# Patient Record
Sex: MALE
[De-identification: names, ages, dates, MRNs, and addresses within clinical notes are randomized per-mention and may not be internally consistent; named-entity substitution may affect disease eponyms.]

---

## 2017-04-20 PROBLEM — Z00.00 ENCOUNTER FOR PREVENTIVE HEALTH EXAMINATION: Status: ACTIVE | Noted: 2017-04-20

## 2017-04-24 ENCOUNTER — APPOINTMENT (OUTPATIENT)
Dept: OTOLARYNGOLOGY | Facility: CLINIC | Age: 67
End: 2017-04-24

## 2017-04-24 VITALS
SYSTOLIC BLOOD PRESSURE: 131 MMHG | WEIGHT: 185 LBS | HEART RATE: 88 BPM | HEIGHT: 69 IN | DIASTOLIC BLOOD PRESSURE: 80 MMHG | BODY MASS INDEX: 27.4 KG/M2

## 2017-04-24 DIAGNOSIS — Z86.39 PERSONAL HISTORY OF OTHER ENDOCRINE, NUTRITIONAL AND METABOLIC DISEASE: ICD-10-CM

## 2017-04-24 DIAGNOSIS — E78.00 PURE HYPERCHOLESTEROLEMIA, UNSPECIFIED: ICD-10-CM

## 2017-04-24 DIAGNOSIS — Z78.9 OTHER SPECIFIED HEALTH STATUS: ICD-10-CM

## 2017-04-24 DIAGNOSIS — Z83.3 FAMILY HISTORY OF DIABETES MELLITUS: ICD-10-CM

## 2017-04-24 DIAGNOSIS — Z86.79 PERSONAL HISTORY OF OTHER DISEASES OF THE CIRCULATORY SYSTEM: ICD-10-CM

## 2017-04-24 RX ORDER — METOCLOPRAMIDE 10 MG/1
10 TABLET ORAL
Qty: 3 | Refills: 0 | Status: ACTIVE | COMMUNITY
Start: 2016-12-14

## 2017-04-24 RX ORDER — ATORVASTATIN CALCIUM 20 MG/1
20 TABLET, FILM COATED ORAL
Qty: 30 | Refills: 0 | Status: ACTIVE | COMMUNITY
Start: 2017-03-28

## 2017-04-24 RX ORDER — BISMUTH SUBSALICYLATE 262 MG
262 TABLET,CHEWABLE ORAL
Qty: 112 | Refills: 0 | Status: ACTIVE | COMMUNITY
Start: 2017-01-25

## 2017-04-24 RX ORDER — METFORMIN HYDROCHLORIDE 850 MG/1
850 TABLET, COATED ORAL
Qty: 60 | Refills: 0 | Status: ACTIVE | COMMUNITY
Start: 2017-03-28

## 2017-04-24 RX ORDER — OMEPRAZOLE 20 MG/1
20 CAPSULE, DELAYED RELEASE ORAL
Qty: 28 | Refills: 0 | Status: ACTIVE | COMMUNITY
Start: 2017-01-25

## 2017-04-24 RX ORDER — BLOOD SUGAR DIAGNOSTIC
STRIP MISCELLANEOUS
Qty: 50 | Refills: 0 | Status: ACTIVE | COMMUNITY
Start: 2017-03-01

## 2017-04-24 RX ORDER — SODIUM SULFATE, POTASSIUM SULFATE, MAGNESIUM SULFATE 17.5; 3.13; 1.6 G/ML; G/ML; G/ML
17.5-3.13-1.6 SOLUTION, CONCENTRATE ORAL
Qty: 354 | Refills: 0 | Status: ACTIVE | COMMUNITY
Start: 2016-12-14

## 2017-04-24 RX ORDER — DOXYCYCLINE 100 MG/1
100 CAPSULE ORAL
Qty: 28 | Refills: 0 | Status: ACTIVE | COMMUNITY
Start: 2017-01-25

## 2017-04-24 RX ORDER — INSULIN HUMAN 100 [IU]/ML
100 INJECTION, SUSPENSION SUBCUTANEOUS
Qty: 20 | Refills: 0 | Status: ACTIVE | COMMUNITY
Start: 2017-03-28

## 2017-04-24 RX ORDER — EMPAGLIFLOZIN 25 MG/1
25 TABLET, FILM COATED ORAL
Qty: 30 | Refills: 0 | Status: ACTIVE | COMMUNITY
Start: 2017-03-28

## 2017-04-24 RX ORDER — SIMETHICONE 125 MG/1
125 TABLET, CHEWABLE ORAL
Qty: 2 | Refills: 0 | Status: ACTIVE | COMMUNITY
Start: 2016-12-14

## 2017-04-24 RX ORDER — BACITRACIN 500 [USP'U]/G
500 OINTMENT OPHTHALMIC
Qty: 4 | Refills: 0 | Status: ACTIVE | COMMUNITY
Start: 2016-12-27

## 2017-04-24 RX ORDER — AMLODIPINE BESYLATE 10 MG/1
10 TABLET ORAL
Qty: 30 | Refills: 0 | Status: ACTIVE | COMMUNITY
Start: 2017-04-06

## 2017-04-24 RX ORDER — BLOOD-GLUCOSE METER
W/DEVICE KIT MISCELLANEOUS
Qty: 1 | Refills: 0 | Status: ACTIVE | COMMUNITY
Start: 2016-12-07

## 2017-04-24 RX ORDER — LANCETS 30 GAUGE
EACH MISCELLANEOUS
Qty: 100 | Refills: 0 | Status: ACTIVE | COMMUNITY
Start: 2017-03-01

## 2017-04-24 RX ORDER — ENALAPRIL MALEATE 20 MG/1
20 TABLET ORAL
Qty: 30 | Refills: 0 | Status: ACTIVE | COMMUNITY
Start: 2016-12-06

## 2017-04-24 RX ORDER — NAPROXEN 500 MG/1
500 TABLET ORAL
Qty: 60 | Refills: 0 | Status: ACTIVE | COMMUNITY
Start: 2017-02-24

## 2017-04-24 RX ORDER — SYRING-NEEDL,DISP,INSUL,0.3 ML 31 GX5/16"
31G X 5/16" SYRINGE, EMPTY DISPOSABLE MISCELLANEOUS
Qty: 100 | Refills: 0 | Status: ACTIVE | COMMUNITY
Start: 2017-03-01

## 2017-04-24 RX ORDER — ALCOHOL ANTISEPTIC PADS
PADS, MEDICATED (EA) TOPICAL
Qty: 100 | Refills: 0 | Status: ACTIVE | COMMUNITY
Start: 2017-03-01

## 2017-04-24 RX ORDER — METRONIDAZOLE 250 MG/1
250 TABLET ORAL
Qty: 56 | Refills: 0 | Status: ACTIVE | COMMUNITY
Start: 2017-01-25

## 2017-04-24 RX ORDER — LOSARTAN POTASSIUM AND HYDROCHLOROTHIAZIDE 12.5; 5 MG/1; MG/1
50-12.5 TABLET ORAL
Qty: 30 | Refills: 0 | Status: ACTIVE | COMMUNITY
Start: 2017-03-01

## 2017-04-24 RX ORDER — ASPIRIN 81 MG/1
81 TABLET ORAL
Qty: 30 | Refills: 0 | Status: ACTIVE | COMMUNITY
Start: 2017-03-01

## 2017-04-24 RX ORDER — LOSARTAN POTASSIUM AND HYDROCHLOROTHIAZIDE 12.5; 1 MG/1; MG/1
100-12.5 TABLET ORAL
Qty: 30 | Refills: 0 | Status: ACTIVE | COMMUNITY
Start: 2017-04-03

## 2017-05-09 ENCOUNTER — APPOINTMENT (OUTPATIENT)
Dept: OTOLARYNGOLOGY | Facility: CLINIC | Age: 67
End: 2017-05-09

## 2017-05-09 VITALS
DIASTOLIC BLOOD PRESSURE: 67 MMHG | WEIGHT: 185 LBS | SYSTOLIC BLOOD PRESSURE: 106 MMHG | HEART RATE: 100 BPM | BODY MASS INDEX: 27.4 KG/M2 | HEIGHT: 69 IN

## 2018-04-03 ENCOUNTER — APPOINTMENT (OUTPATIENT)
Dept: OTOLARYNGOLOGY | Facility: CLINIC | Age: 68
End: 2018-04-03
Payer: MEDICAID

## 2018-04-03 VITALS
HEART RATE: 74 BPM | SYSTOLIC BLOOD PRESSURE: 136 MMHG | DIASTOLIC BLOOD PRESSURE: 80 MMHG | HEIGHT: 69 IN | WEIGHT: 188 LBS | BODY MASS INDEX: 27.85 KG/M2

## 2018-04-03 DIAGNOSIS — R04.0 EPISTAXIS: ICD-10-CM

## 2018-04-03 PROCEDURE — 99213 OFFICE O/P EST LOW 20 MIN: CPT | Mod: 25

## 2018-04-03 PROCEDURE — 31238 NSL/SINS NDSC SRG NSL HEMRRG: CPT | Mod: RT

## 2018-10-01 ENCOUNTER — OUTPATIENT (OUTPATIENT)
Dept: OUTPATIENT SERVICES | Facility: HOSPITAL | Age: 68
LOS: 1 days | End: 2018-10-01
Payer: MEDICAID

## 2018-10-01 PROCEDURE — G9001: CPT

## 2018-10-22 ENCOUNTER — INPATIENT (INPATIENT)
Facility: HOSPITAL | Age: 68
LOS: 1 days | Discharge: HOME | DRG: 301 | End: 2018-10-24
Attending: STUDENT IN AN ORGANIZED HEALTH CARE EDUCATION/TRAINING PROGRAM | Admitting: STUDENT IN AN ORGANIZED HEALTH CARE EDUCATION/TRAINING PROGRAM
Payer: MEDICAID

## 2018-10-22 VITALS
RESPIRATION RATE: 18 BRPM | OXYGEN SATURATION: 96 % | SYSTOLIC BLOOD PRESSURE: 169 MMHG | TEMPERATURE: 99 F | HEART RATE: 76 BPM | DIASTOLIC BLOOD PRESSURE: 80 MMHG

## 2018-10-22 LAB
ALBUMIN SERPL ELPH-MCNC: 4.9 G/DL — SIGNIFICANT CHANGE UP (ref 3.3–5)
ALP SERPL-CCNC: 73 U/L — SIGNIFICANT CHANGE UP (ref 40–120)
ALT FLD-CCNC: 16 U/L — SIGNIFICANT CHANGE UP (ref 10–45)
ANION GAP SERPL CALC-SCNC: 13 MMOL/L — SIGNIFICANT CHANGE UP (ref 5–17)
APTT BLD: 32.7 SEC — SIGNIFICANT CHANGE UP (ref 27.5–37.4)
AST SERPL-CCNC: 16 U/L — SIGNIFICANT CHANGE UP (ref 10–40)
BASOPHILS # BLD AUTO: 0.1 K/UL — SIGNIFICANT CHANGE UP (ref 0–0.2)
BASOPHILS NFR BLD AUTO: 0.8 % — SIGNIFICANT CHANGE UP (ref 0–2)
BILIRUB SERPL-MCNC: 0.6 MG/DL — SIGNIFICANT CHANGE UP (ref 0.2–1.2)
BUN SERPL-MCNC: 18 MG/DL — SIGNIFICANT CHANGE UP (ref 7–23)
CALCIUM SERPL-MCNC: 9.8 MG/DL — SIGNIFICANT CHANGE UP (ref 8.4–10.5)
CHLORIDE SERPL-SCNC: 101 MMOL/L — SIGNIFICANT CHANGE UP (ref 96–108)
CO2 SERPL-SCNC: 25 MMOL/L — SIGNIFICANT CHANGE UP (ref 22–31)
CREAT SERPL-MCNC: 1.01 MG/DL — SIGNIFICANT CHANGE UP (ref 0.5–1.3)
EOSINOPHIL # BLD AUTO: 0.3 K/UL — SIGNIFICANT CHANGE UP (ref 0–0.5)
EOSINOPHIL NFR BLD AUTO: 3.2 % — SIGNIFICANT CHANGE UP (ref 0–6)
ERYTHROCYTE [SEDIMENTATION RATE] IN BLOOD: 11 MM/HR — SIGNIFICANT CHANGE UP (ref 0–20)
GLUCOSE SERPL-MCNC: 129 MG/DL — HIGH (ref 70–99)
HCT VFR BLD CALC: 47 % — SIGNIFICANT CHANGE UP (ref 39–50)
HGB BLD-MCNC: 16 G/DL — SIGNIFICANT CHANGE UP (ref 13–17)
INR BLD: 0.97 RATIO — SIGNIFICANT CHANGE UP (ref 0.88–1.16)
LYMPHOCYTES # BLD AUTO: 2.3 K/UL — SIGNIFICANT CHANGE UP (ref 1–3.3)
LYMPHOCYTES # BLD AUTO: 28.5 % — SIGNIFICANT CHANGE UP (ref 13–44)
MCHC RBC-ENTMCNC: 30 PG — SIGNIFICANT CHANGE UP (ref 27–34)
MCHC RBC-ENTMCNC: 34 GM/DL — SIGNIFICANT CHANGE UP (ref 32–36)
MCV RBC AUTO: 88.2 FL — SIGNIFICANT CHANGE UP (ref 80–100)
MONOCYTES # BLD AUTO: 1 K/UL — HIGH (ref 0–0.9)
MONOCYTES NFR BLD AUTO: 12.3 % — SIGNIFICANT CHANGE UP (ref 2–14)
NEUTROPHILS # BLD AUTO: 4.4 K/UL — SIGNIFICANT CHANGE UP (ref 1.8–7.4)
NEUTROPHILS NFR BLD AUTO: 55.2 % — SIGNIFICANT CHANGE UP (ref 43–77)
PLATELET # BLD AUTO: 207 K/UL — SIGNIFICANT CHANGE UP (ref 150–400)
POTASSIUM SERPL-MCNC: 3.8 MMOL/L — SIGNIFICANT CHANGE UP (ref 3.5–5.3)
POTASSIUM SERPL-SCNC: 3.8 MMOL/L — SIGNIFICANT CHANGE UP (ref 3.5–5.3)
PROT SERPL-MCNC: 7.9 G/DL — SIGNIFICANT CHANGE UP (ref 6–8.3)
PROTHROM AB SERPL-ACNC: 10.6 SEC — SIGNIFICANT CHANGE UP (ref 9.8–12.7)
RBC # BLD: 5.33 M/UL — SIGNIFICANT CHANGE UP (ref 4.2–5.8)
RBC # FLD: 12.3 % — SIGNIFICANT CHANGE UP (ref 10.3–14.5)
SODIUM SERPL-SCNC: 139 MMOL/L — SIGNIFICANT CHANGE UP (ref 135–145)
WBC # BLD: 8.1 K/UL — SIGNIFICANT CHANGE UP (ref 3.8–10.5)
WBC # FLD AUTO: 8.1 K/UL — SIGNIFICANT CHANGE UP (ref 3.8–10.5)

## 2018-10-22 PROCEDURE — 70496 CT ANGIOGRAPHY HEAD: CPT | Mod: 26

## 2018-10-22 PROCEDURE — 70498 CT ANGIOGRAPHY NECK: CPT | Mod: 26

## 2018-10-22 PROCEDURE — 99285 EMERGENCY DEPT VISIT HI MDM: CPT | Mod: 25

## 2018-10-22 PROCEDURE — 93010 ELECTROCARDIOGRAM REPORT: CPT

## 2018-10-22 PROCEDURE — 70450 CT HEAD/BRAIN W/O DYE: CPT | Mod: 26,59

## 2018-10-22 RX ORDER — DIAZEPAM 5 MG
5 TABLET ORAL ONCE
Refills: 0 | Status: DISCONTINUED | OUTPATIENT
Start: 2018-10-22 | End: 2018-10-22

## 2018-10-22 RX ORDER — ACETAMINOPHEN 500 MG
1000 TABLET ORAL ONCE
Refills: 0 | Status: COMPLETED | OUTPATIENT
Start: 2018-10-22 | End: 2018-10-22

## 2018-10-22 RX ORDER — SODIUM CHLORIDE 9 MG/ML
1000 INJECTION, SOLUTION INTRAVENOUS
Refills: 0 | Status: DISCONTINUED | OUTPATIENT
Start: 2018-10-22 | End: 2018-10-23

## 2018-10-22 RX ORDER — DIPHENHYDRAMINE HCL 50 MG
25 CAPSULE ORAL ONCE
Refills: 0 | Status: COMPLETED | OUTPATIENT
Start: 2018-10-22 | End: 2018-10-22

## 2018-10-22 RX ORDER — METOCLOPRAMIDE HCL 10 MG
10 TABLET ORAL ONCE
Refills: 0 | Status: COMPLETED | OUTPATIENT
Start: 2018-10-22 | End: 2018-10-22

## 2018-10-22 RX ADMIN — Medication 400 MILLIGRAM(S): at 20:42

## 2018-10-22 RX ADMIN — Medication 1000 MILLIGRAM(S): at 21:17

## 2018-10-22 RX ADMIN — SODIUM CHLORIDE 150 MILLILITER(S): 9 INJECTION, SOLUTION INTRAVENOUS at 20:42

## 2018-10-22 RX ADMIN — Medication 25 MILLIGRAM(S): at 20:42

## 2018-10-22 RX ADMIN — Medication 10 MILLIGRAM(S): at 20:43

## 2018-10-22 NOTE — ED PROVIDER NOTE - ATTENDING CONTRIBUTION TO CARE
------------ATTENDING NOTE------------   pt w/ daughter c/o sudden onset of moderate constant severe stabbing pain and tightness in R neck, worse w/ any movement, mild dull ache in R lower jaw, no chest pain/discomfort or sob/dyspnea, has equal distal pulses, no abdominal pain, no medications for tox torticollis, awaiting -->  - Rolan Nunes MD   ---------------------------------------------- ------------ATTENDING NOTE------------   pt w/ daughter c/o sudden onset of moderate constant severe stabbing pain and tightness in R neck, worse w/ any movement, mild dull ache in R lower jaw, no chest pain/discomfort or sob/dyspnea, has equal distal pulses, no abdominal pain, no medications for tox torticollis, awaiting imaign --> pain improved, no new symptoms, ED Sign Out midnight (Benji) pending imaging and reassessments, likely dc if all wnl and nml VS and improved exam.  - Rolan Nunes MD   ----------------------------------------------

## 2018-10-22 NOTE — ED ADULT NURSE NOTE - NSIMPLEMENTINTERV_GEN_ALL_ED
Implemented All Universal Safety Interventions:  [place] to call system. Call bell, personal items and telephone within reach. Instruct patient to call for assistance. Room bathroom lighting operational. Non-slip footwear when patient is off stretcher. Physically safe environment: no spills, clutter or unnecessary equipment. Stretcher in lowest position, wheels locked, appropriate side rails in place.

## 2018-10-22 NOTE — ED ADULT NURSE NOTE - OBJECTIVE STATEMENT
pt is a 68 yr M presents with sudden onset of R sided neck pain and headache since 1pm. denies n/v/fever/chills/blurry vision/dizziness/cp/sob. pt holding R side of neck. neuro intact. VSS. no distress.

## 2018-10-22 NOTE — ED PROVIDER NOTE - PHYSICAL EXAMINATION
***GEN - well appearing; NAD   ***HEAD - NC/AT  ***EYES/NOSE - PEERL, EOMI, mucous membranes moist, no discharge   ***THROAT: Oral cavity and pharynx normal. No inflammation, swelling, exudate, or lesions.    ***NECK: supple, non-tender no lymphadenopathy  ***PULMONARY - CTA b/l, symmetric breath sounds.   ***CARDIAC- s1s2, RRR, no murmur  ***ABDOMEN - +BS, ND, NT, soft, no guarding, no rebound, no organomegaly  ***BACK - no CVA tenderness, Normal  spine, no spinal TTP  ***EXTREMITIES - symmetric pulses, 2+ dp, capillary refill < 2 seconds, no clubbing, no cyanosis, no edema.  Spasm of R sided neck muscles.    ***SKIN - warm, dry, intact, no rash or bruising   ***NEUROLOGIC - a&o x3, CN 3-12 intact, sensation nl, motor 5/5 RUE/LUE/RLE/LLE

## 2018-10-22 NOTE — ED ADULT NURSE REASSESSMENT NOTE - NS ED NURSE REASSESS COMMENT FT1
Patient urinated using urinal. Family member discarded urine. Pending UA. Patient and family member made aware of pending urine sample. Clean urinal placed at bed side. Patient resting comfortably in bed, no complaints at this time. VSS. Will continue to monitor.

## 2018-10-22 NOTE — ED PROVIDER NOTE - MEDICAL DECISION MAKING DETAILS
see attending note    *pt speaks Icelandic/english, daughter easily translating, declined additional services

## 2018-10-22 NOTE — ED PROVIDER NOTE - NS ED ROS FT
Constitutional: no fever  Eyes: no conjunctivitis  Ears: no ear pain   Nose: no nasal congestion, Mouth/Throat: no throat pain, Neck: no stiffness  Cardiovascular: no chest pain  Chest: no cough  Gastrointestinal: no abdominal pain, no vomiting and diarrhea  MSK: As noted in hpi   : no dysuria  Skin: no rash  Neuro: no LOC, no focal deficits  All other review of systems negative except as noted in HPI

## 2018-10-22 NOTE — ED ADULT NURSE NOTE - CHPI ED NUR SYMPTOMS NEG
no blurred vision/no change in level of consciousness/no confusion/no dizziness/no fever/no loss of consciousness/no nausea/no numbness/no vomiting/no weakness

## 2018-10-23 DIAGNOSIS — M54.2 CERVICALGIA: ICD-10-CM

## 2018-10-23 LAB
APPEARANCE UR: CLEAR — SIGNIFICANT CHANGE UP
BACTERIA # UR AUTO: NEGATIVE — SIGNIFICANT CHANGE UP
BILIRUB UR-MCNC: NEGATIVE — SIGNIFICANT CHANGE UP
COLOR SPEC: COLORLESS — SIGNIFICANT CHANGE UP
DIFF PNL FLD: NEGATIVE — SIGNIFICANT CHANGE UP
EPI CELLS # UR: 0 /HPF — SIGNIFICANT CHANGE UP
GLUCOSE UR QL: ABNORMAL
HYALINE CASTS # UR AUTO: 0 /LPF — SIGNIFICANT CHANGE UP (ref 0–2)
KETONES UR-MCNC: NEGATIVE — SIGNIFICANT CHANGE UP
LEUKOCYTE ESTERASE UR-ACNC: NEGATIVE — SIGNIFICANT CHANGE UP
NITRITE UR-MCNC: NEGATIVE — SIGNIFICANT CHANGE UP
PH UR: 7 — SIGNIFICANT CHANGE UP (ref 5–8)
PROT UR-MCNC: SIGNIFICANT CHANGE UP
RBC CASTS # UR COMP ASSIST: 1 /HPF — SIGNIFICANT CHANGE UP (ref 0–4)
SP GR SPEC: 1.03 — HIGH (ref 1.01–1.02)
UROBILINOGEN FLD QL: NEGATIVE — SIGNIFICANT CHANGE UP
WBC UR QL: 0 /HPF — SIGNIFICANT CHANGE UP (ref 0–5)

## 2018-10-23 PROCEDURE — 70551 MRI BRAIN STEM W/O DYE: CPT | Mod: 26

## 2018-10-23 PROCEDURE — 70549 MR ANGIOGRAPH NECK W/O&W/DYE: CPT | Mod: 26

## 2018-10-23 PROCEDURE — 99221 1ST HOSP IP/OBS SF/LOW 40: CPT

## 2018-10-23 PROCEDURE — 70544 MR ANGIOGRAPHY HEAD W/O DYE: CPT | Mod: 26,59

## 2018-10-23 RX ORDER — DEXTROSE 50 % IN WATER 50 %
15 SYRINGE (ML) INTRAVENOUS ONCE
Refills: 0 | Status: DISCONTINUED | OUTPATIENT
Start: 2018-10-23 | End: 2018-10-24

## 2018-10-23 RX ORDER — ASPIRIN/CALCIUM CARB/MAGNESIUM 324 MG
81 TABLET ORAL DAILY
Refills: 0 | Status: DISCONTINUED | OUTPATIENT
Start: 2018-10-23 | End: 2018-10-24

## 2018-10-23 RX ORDER — ACETAMINOPHEN 500 MG
1000 TABLET ORAL ONCE
Refills: 0 | Status: COMPLETED | OUTPATIENT
Start: 2018-10-23 | End: 2018-10-23

## 2018-10-23 RX ORDER — GLUCAGON INJECTION, SOLUTION 0.5 MG/.1ML
1 INJECTION, SOLUTION SUBCUTANEOUS ONCE
Refills: 0 | Status: DISCONTINUED | OUTPATIENT
Start: 2018-10-23 | End: 2018-10-24

## 2018-10-23 RX ORDER — SODIUM CHLORIDE 9 MG/ML
1000 INJECTION, SOLUTION INTRAVENOUS
Refills: 0 | Status: DISCONTINUED | OUTPATIENT
Start: 2018-10-23 | End: 2018-10-24

## 2018-10-23 RX ORDER — ENOXAPARIN SODIUM 100 MG/ML
40 INJECTION SUBCUTANEOUS DAILY
Refills: 0 | Status: DISCONTINUED | OUTPATIENT
Start: 2018-10-23 | End: 2018-10-24

## 2018-10-23 RX ORDER — DEXTROSE 50 % IN WATER 50 %
12.5 SYRINGE (ML) INTRAVENOUS ONCE
Refills: 0 | Status: DISCONTINUED | OUTPATIENT
Start: 2018-10-23 | End: 2018-10-24

## 2018-10-23 RX ORDER — DEXTROSE 50 % IN WATER 50 %
25 SYRINGE (ML) INTRAVENOUS ONCE
Refills: 0 | Status: DISCONTINUED | OUTPATIENT
Start: 2018-10-23 | End: 2018-10-24

## 2018-10-23 RX ORDER — CLOPIDOGREL BISULFATE 75 MG/1
75 TABLET, FILM COATED ORAL DAILY
Refills: 0 | Status: DISCONTINUED | OUTPATIENT
Start: 2018-10-23 | End: 2018-10-24

## 2018-10-23 RX ORDER — INSULIN LISPRO 100/ML
VIAL (ML) SUBCUTANEOUS
Refills: 0 | Status: DISCONTINUED | OUTPATIENT
Start: 2018-10-23 | End: 2018-10-24

## 2018-10-23 RX ORDER — INFLUENZA VIRUS VACCINE 15; 15; 15; 15 UG/.5ML; UG/.5ML; UG/.5ML; UG/.5ML
0.5 SUSPENSION INTRAMUSCULAR ONCE
Refills: 0 | Status: DISCONTINUED | OUTPATIENT
Start: 2018-10-23 | End: 2018-10-24

## 2018-10-23 RX ADMIN — CLOPIDOGREL BISULFATE 75 MILLIGRAM(S): 75 TABLET, FILM COATED ORAL at 12:22

## 2018-10-23 RX ADMIN — ENOXAPARIN SODIUM 40 MILLIGRAM(S): 100 INJECTION SUBCUTANEOUS at 12:24

## 2018-10-23 RX ADMIN — Medication 1000 MILLIGRAM(S): at 15:20

## 2018-10-23 RX ADMIN — Medication 400 MILLIGRAM(S): at 21:22

## 2018-10-23 RX ADMIN — Medication 3: at 18:11

## 2018-10-23 RX ADMIN — Medication 1000 MILLIGRAM(S): at 21:37

## 2018-10-23 RX ADMIN — Medication 400 MILLIGRAM(S): at 15:00

## 2018-10-23 RX ADMIN — Medication 81 MILLIGRAM(S): at 12:22

## 2018-10-23 NOTE — H&P ADULT - ATTENDING COMMENTS
right neck pain  and focal distal ICA dissection.  No evidence of CVA.  On physical he has tenderness at the base of the neck, close to the clavicle.  No horners.  No motor/sensory deficits.    CT: distal ICA dissection, near the base of skull, just before the petrous portion    rec asa/plavix.  He has a history of nosebleed, will monitor.  neuro eval

## 2018-10-23 NOTE — H&P ADULT - ASSESSMENT
68M Hx Htn and DM who's presenting with 1 day of right neck pain. 68M Hx Htn and DM who's presenting with 1 day of right neck pain. CT concerning for right internal jugular dissection    - Admit to Dr. Escoto  - Will observe for neurological sequelae  - ASA 81  - Regular diet  - ISS for DMII  - Discussed with fellow    [id number] 68M Hx Htn and DM who's presenting with 1 day of right neck pain. CT concerning for right internal ICA dissection    - Admit to Dr. Escoto  - Will observe for neurological sequelae  - ASA 81  - Regular diet  - ISS for DMII  - Discussed with fellow    [id number]

## 2018-10-23 NOTE — H&P ADULT - HISTORY OF PRESENT ILLNESS
68M Hx Htn, DM who presented with 1 day of right neck pain. The patient was doing dishes in his home when he noticed the pain. He did not have any sudden movements or trauma. The pain continued and was constant. It is worse when he turns his head to the right and slightly better when he turns his head to the left. He has not had any other symptoms such as neurological deficits (no numbness/tingling in extremities, weakness, CN deficits).

## 2018-10-23 NOTE — PROGRESS NOTE ADULT - ASSESSMENT
68M Hx Htn and DM who's presenting with 1 day of right neck pain. Patient admitted with age indeterminate small focal dissection flap in right internal jugular found on CT scan.     Plan  - C/W serial neurological exam  - ASA 81  - DVT ppx with Lovenox  - Regular diet  - ISS for DMII      Shameka Darden PA-C  Vascular Surgery   [id number] 68M Hx Htn and DM who's presenting with 1 day of right neck pain. Patient admitted with age indeterminate small focal dissection flap in right internal jugular found on CT scan.     Plan  - C/W serial neurological exam  - ASA 81, Plavix 75  -- Has history of nosebleeds with ASA, will contact PMD to notify.   - DVT ppx with Lovenox  - Regular diet  - ISS for DMII      Shameka Darden PA-C  Vascular Surgery   [id number]

## 2018-10-23 NOTE — CONSULT NOTE ADULT - SUBJECTIVE AND OBJECTIVE BOX
Neurology Consult    Reason for consult: Carotid dissection    HPI: Patient is a 68 year old right handed Frisian male admitted for carotid dissection. Patient has PMH of HTN and DM. States he was washing dishes when all of a sudden he developed a sharp pain in the right side of his neck. He was unable to turn his head. Denies any numbness, tingling, weakness, changes in vision.    REVIEW OF SYSTEMS:  Constitutional: No fever, chills, fatigue, weakness.  Eyes: No eye pain, visual disturbances, or discharge.  ENT:  No difficulty hearing, tinnitus, vertigo. No sinus or throat pain.  Neck: No pain or stiffness.  Respiratory: No cough, dyspnea, wheezing.  Cardiovascular: No chest pain, palpitations.  Gastrointestinal: No abdominal pain. No nausea, vomiting, diarrhea, or constipation.   Genitourinary: No dysuria, frequency, hematuria or incontinence.  Neurological: No headaches, lightheadedness, vertigo, numbness or tremors.  Psychiatric: No depression, anxiety, mood swings, or difficulty sleeping.  Musculoskeletal: No joint pain or swelling. No muscle, back, or extremity pain. Neck pain.  Skin: No itching, burning, rashes or lesions.   Lymph Nodes: No enlarged glands  Endocrine: No heat or cold intolerance, no hair loss.  Allergy and Immunologic: No hives or eczema.    MEDICATIONS  aspirin  chewable 81 milliGRAM(s) Oral daily  clopidogrel Tablet 75 milliGRAM(s) Oral daily  dextrose 40% Gel 15 Gram(s) Oral once PRN  dextrose 5%. 1000 milliLiter(s) IV Continuous <Continuous>  dextrose 50% Injectable 12.5 Gram(s) IV Push once  dextrose 50% Injectable 25 Gram(s) IV Push once  dextrose 50% Injectable 25 Gram(s) IV Push once  enoxaparin Injectable 40 milliGRAM(s) SubCutaneous daily  glucagon  Injectable 1 milliGRAM(s) IntraMuscular once PRN  influenza   Vaccine 0.5 milliLiter(s) IntraMuscular once  insulin lispro (HumaLOG) corrective regimen sliding scale   SubCutaneous three times a day before meals      PMH: DM (diabetes mellitus)  HTN (hypertension)       PSH: No significant past surgical history      FAMILY HISTORY:  No pertinent family history in first degree relatives    No history of dementia, strokes, or seizures     SOCIAL HISTORY:  No history of tobacco or alcohol use     Allergies  No Known Allergies    Vital Signs Last 24 Hrs  T(C): 36.8 (23 Oct 2018 10:39), Max: 37.1 (22 Oct 2018 20:12)  T(F): 98.3 (23 Oct 2018 10:39), Max: 98.7 (22 Oct 2018 20:12)  HR: 57 (23 Oct 2018 10:39) (57 - 83)  BP: 144/95 (23 Oct 2018 10:39) (130/89 - 169/80)  RR: 18 (23 Oct 2018 10:39) (15 - 18)  SpO2: 95% (23 Oct 2018 10:39) (95% - 98%)    Neurological Examination:    Mental Status: Patient is alert, awake, oriented X3. Patient is fluent, no dysarthria, no aphasia. Follows commands well and able to answer questions appropriately. Mood and affect normal.    Cranial Nerves: PERRL, EOMI, visual field intact, V1-V3 intact, no gross facial asymmetry, tongue/uvula midline  Unable to move neck side to side or up and down without pain.    Motor Exam: No drift  Right upper extremity: 5/5  Left upper extremity: 5/5  Right lower extremity: 5/5  Left lower extremity: 5/5    Normal bulk/tone    Sensory: Intact to light touch bilaterally. No extinction    Coordination: Finger to nose intact bilaterally     GENERAL: No acute distress  HEENT:  Normocephalic, atraumatic  EXTREMITIES: No edema, clubbing, cyanosis  MUSCULOSKELETAL: Normal range of motion  SKIN: No rashes    LABS:  CBC Full  -  ( 22 Oct 2018 20:57 )  WBC Count : 8.1 K/uL  Hemoglobin : 16.0 g/dL  Hematocrit : 47.0 %  Platelet Count - Automated : 207 K/uL  Mean Cell Volume : 88.2 fl  Mean Cell Hemoglobin : 30.0 pg  Mean Cell Hemoglobin Concentration : 34.0 gm/dL  Auto Neutrophil # : 4.4 K/uL  Auto Lymphocyte # : 2.3 K/uL  Auto Monocyte # : 1.0 K/uL  Auto Eosinophil # : 0.3 K/uL  Auto Basophil # : 0.1 K/uL  Auto Neutrophil % : 55.2 %  Auto Lymphocyte % : 28.5 %  Auto Monocyte % : 12.3 %  Auto Eosinophil % : 3.2 %  Auto Basophil % : 0.8 %    Urinalysis Basic - ( 23 Oct 2018 01:52 )    Color: Colorless / Appearance: Clear / S.034 / pH: x  Gluc: x / Ketone: Negative  / Bili: Negative / Urobili: Negative   Blood: x / Protein: Trace / Nitrite: Negative   Leuk Esterase: Negative / RBC: 1 /hpf / WBC 0 /hpf   Sq Epi: x / Non Sq Epi: 0 /hpf / Bacteria: Negative      10-22    139  |  101  |  18  ----------------------------<  129<H>  3.8   |  25  |  1.01    Ca    9.8      22 Oct 2018 20:57    TPro  7.9  /  Alb  4.9  /  TBili  0.6  /  DBili  x   /  AST  16  /  ALT  16  /  AlkPhos  73  10-22    LIVER FUNCTIONS - ( 22 Oct 2018 20:57 )  Alb: 4.9 g/dL / Pro: 7.9 g/dL / ALK PHOS: 73 U/L / ALT: 16 U/L / AST: 16 U/L / GGT: x           PT/INR - ( 22 Oct 2018 20:57 )   PT: 10.6 sec;   INR: 0.97 ratio      PTT - ( 22 Oct 2018 20:57 )  PTT:32.7 sec

## 2018-10-23 NOTE — CONSULT NOTE ADULT - ATTENDING COMMENTS
Patient seen and examined. Plan discussed with patients daughter at bedside. Agree with antiplatelets. No objection to MRI/A. Outpatient neurologic follow-up for repeat vessel imaging in 3 months.

## 2018-10-23 NOTE — CHART NOTE - NSCHARTNOTEFT_GEN_A_CORE
Spoke with patient's private cardiologist, Dr. Misbah Sharp earlier in the afternoon. Patient underwent a cardiac catheterization 7/30 for an abnormal stress test. The cath showed luminal irregularities and normal LV function. No intervention done. Dr. Sharp is not opposed to starting the patient on DAPT, Aspirin and Plavix.   Shameka Darden PA-C   [id number]

## 2018-10-23 NOTE — H&P ADULT - NSHPPHYSICALEXAM_GEN_ALL_CORE
PHYSICAL EXAM:  GENERAL: NAD, well-developed  HEAD:  Atraumatic, Normocephalic  EYES: EOMI, PERRLA, conjunctiva and sclera clear  NECK: Supple, No JVD  CHEST/LUNG: Clear to auscultation bilaterally; No wheeze  HEART: Regular rate and rhythm; No murmurs, rubs, or gallops  ABDOMEN: Soft, Nontender, Nondistended; Bowel sounds present  EXTREMITIES:  2+ Peripheral Pulses, No clubbing, cyanosis, or edema  PSYCH: AAOx3  NEUROLOGY: CN's grossly normal, 5/5 strength in upper and lower extremities b/l. No sensation loss.   SKIN: No rashes or lesions

## 2018-10-23 NOTE — CHART NOTE - NSCHARTNOTEFT_GEN_A_CORE
Patient complaining of R. neck pain since returning from MRI scanner.  Patient states it is the same pain that he has had since admission and is worse when he rotates his neck.  He denies any visual disturbances.    Neurological exam: CN II-XII intact.  No facial droop.  Tongue midline.  PEERLA.  Strength 5/5 bl/ UE LE.    Plan  - tylenol for pain as needed  - q4h neurological checks  - f/u MRI results  - f/u neuro reccs  - c/w asa/plavix      JEEVAN KramerC

## 2018-10-23 NOTE — H&P ADULT - NSHPLABSRESULTS_GEN_ALL_CORE
Vital Signs Last 24 Hrs  T(C): 37 (23 Oct 2018 01:34), Max: 37.1 (22 Oct 2018 20:12)  T(F): 98.6 (23 Oct 2018 01:34), Max: 98.7 (22 Oct 2018 20:12)  HR: 74 (23 Oct 2018 01:34) (74 - 83)  BP: 132/82 (23 Oct 2018 01:34) (131/78 - 169/80)  BP(mean): --  RR: 16 (23 Oct 2018 01:34) (15 - 18)  SpO2: 97% (23 Oct 2018 01:34) (95% - 97%)        LABS:                        16.0   8.1   )-----------( 207      ( 22 Oct 2018 20:57 )             47.0     10-    139  |  101  |  18  ----------------------------<  129<H>  3.8   |  25  |  1.01    Ca    9.8      22 Oct 2018 20:57    TPro  7.9  /  Alb  4.9  /  TBili  0.6  /  DBili  x   /  AST  16  /  ALT  16  /  AlkPhos  73  10-22    PT/INR - ( 22 Oct 2018 20:57 )   PT: 10.6 sec;   INR: 0.97 ratio         PTT - ( 22 Oct 2018 20:57 )  PTT:32.7 sec  Urinalysis Basic - ( 23 Oct 2018 01:52 )    Color: Colorless / Appearance: Clear / S.034 / pH: x  Gluc: x / Ketone: Negative  / Bili: Negative / Urobili: Negative   Blood: x / Protein: Trace / Nitrite: Negative   Leuk Esterase: Negative / RBC: 1 /hpf / WBC 0 /hpf   Sq Epi: x / Non Sq Epi: 0 /hpf / Bacteria: Negative Vital Signs Last 24 Hrs  T(C): 37 (23 Oct 2018 01:34), Max: 37.1 (22 Oct 2018 20:12)  T(F): 98.6 (23 Oct 2018 01:34), Max: 98.7 (22 Oct 2018 20:12)  HR: 74 (23 Oct 2018 01:34) (74 - 83)  BP: 132/82 (23 Oct 2018 01:34) (131/78 - 169/80)  BP(mean): --  RR: 16 (23 Oct 2018 01:34) (15 - 18)  SpO2: 97% (23 Oct 2018 01:34) (95% - 97%)        LABS:                        16.0   8.1   )-----------( 207      ( 22 Oct 2018 20:57 )             47.0     10-    139  |  101  |  18  ----------------------------<  129<H>  3.8   |  25  |  1.01    Ca    9.8      22 Oct 2018 20:57    TPro  7.9  /  Alb  4.9  /  TBili  0.6  /  DBili  x   /  AST  16  /  ALT  16  /  AlkPhos  73  10-22    PT/INR - ( 22 Oct 2018 20:57 )   PT: 10.6 sec;   INR: 0.97 ratio         PTT - ( 22 Oct 2018 20:57 )  PTT:32.7 sec  Urinalysis Basic - ( 23 Oct 2018 01:52 )    Color: Colorless / Appearance: Clear / S.034 / pH: x  Gluc: x / Ketone: Negative  / Bili: Negative / Urobili: Negative   Blood: x / Protein: Trace / Nitrite: Negative   Leuk Esterase: Negative / RBC: 1 /hpf / WBC 0 /hpf   Sq Epi: x / Non Sq Epi: 0 /hpf / Bacteria: Negative    < from: CT Angio Head w/ IV Cont (10.22.18 @ 23:44) >    IMPRESSION:       CT and CTA Head:  1.  No acute intracranial hemorrhage, mass effect, abnormal enhancement   or acute territorial infarct.  2.  Patent intracranial vasculature without evidence of major vessel   occlusion or proximal stenosis.     CTA neck:   Focal tortuosity distal cervical segment right internal carotid artery   with age-indeterminate small focal dissection flap. Nonspecific adjacent   inflammatory change in the right parapharyngeal fat. Consider MRA of the   neck with and without IV contrast including a T1 fat saturated sequence   for further characterization. The inflammatory change could be from a   more acute process or possibly carotidynia.    No major vessel occlusion. No hemodynamically significant stenosis using   NASCET criteria.      1 cm indeterminate hypodense nodule right thyroid lobe.    < end of copied text >

## 2018-10-24 ENCOUNTER — TRANSCRIPTION ENCOUNTER (OUTPATIENT)
Age: 68
End: 2018-10-24

## 2018-10-24 VITALS
HEART RATE: 88 BPM | SYSTOLIC BLOOD PRESSURE: 151 MMHG | RESPIRATION RATE: 18 BRPM | OXYGEN SATURATION: 96 % | DIASTOLIC BLOOD PRESSURE: 75 MMHG | TEMPERATURE: 99 F

## 2018-10-24 DIAGNOSIS — Z71.89 OTHER SPECIFIED COUNSELING: ICD-10-CM

## 2018-10-24 LAB
ANION GAP SERPL CALC-SCNC: 15 MMOL/L — SIGNIFICANT CHANGE UP (ref 5–17)
BUN SERPL-MCNC: 20 MG/DL — SIGNIFICANT CHANGE UP (ref 7–23)
CALCIUM SERPL-MCNC: 9.5 MG/DL — SIGNIFICANT CHANGE UP (ref 8.4–10.5)
CHLORIDE SERPL-SCNC: 101 MMOL/L — SIGNIFICANT CHANGE UP (ref 96–108)
CO2 SERPL-SCNC: 22 MMOL/L — SIGNIFICANT CHANGE UP (ref 22–31)
CREAT SERPL-MCNC: 0.87 MG/DL — SIGNIFICANT CHANGE UP (ref 0.5–1.3)
GLUCOSE SERPL-MCNC: 147 MG/DL — HIGH (ref 70–99)
HBA1C BLD-MCNC: 9.6 % — HIGH (ref 4–5.6)
HCT VFR BLD CALC: 46.8 % — SIGNIFICANT CHANGE UP (ref 39–50)
HGB BLD-MCNC: 15.8 G/DL — SIGNIFICANT CHANGE UP (ref 13–17)
MAGNESIUM SERPL-MCNC: 2.1 MG/DL — SIGNIFICANT CHANGE UP (ref 1.6–2.6)
MCHC RBC-ENTMCNC: 29.9 PG — SIGNIFICANT CHANGE UP (ref 27–34)
MCHC RBC-ENTMCNC: 33.8 GM/DL — SIGNIFICANT CHANGE UP (ref 32–36)
MCV RBC AUTO: 88.4 FL — SIGNIFICANT CHANGE UP (ref 80–100)
PHOSPHATE SERPL-MCNC: 2.9 MG/DL — SIGNIFICANT CHANGE UP (ref 2.5–4.5)
PLATELET # BLD AUTO: 204 K/UL — SIGNIFICANT CHANGE UP (ref 150–400)
POTASSIUM SERPL-MCNC: 4 MMOL/L — SIGNIFICANT CHANGE UP (ref 3.5–5.3)
POTASSIUM SERPL-SCNC: 4 MMOL/L — SIGNIFICANT CHANGE UP (ref 3.5–5.3)
RBC # BLD: 5.29 M/UL — SIGNIFICANT CHANGE UP (ref 4.2–5.8)
RBC # FLD: 12.8 % — SIGNIFICANT CHANGE UP (ref 10.3–14.5)
SODIUM SERPL-SCNC: 138 MMOL/L — SIGNIFICANT CHANGE UP (ref 135–145)
WBC # BLD: 9.6 K/UL — SIGNIFICANT CHANGE UP (ref 3.8–10.5)
WBC # FLD AUTO: 9.6 K/UL — SIGNIFICANT CHANGE UP (ref 3.8–10.5)

## 2018-10-24 PROCEDURE — 81001 URINALYSIS AUTO W/SCOPE: CPT

## 2018-10-24 PROCEDURE — 70549 MR ANGIOGRAPH NECK W/O&W/DYE: CPT

## 2018-10-24 PROCEDURE — 96374 THER/PROPH/DIAG INJ IV PUSH: CPT | Mod: XU

## 2018-10-24 PROCEDURE — 82962 GLUCOSE BLOOD TEST: CPT

## 2018-10-24 PROCEDURE — 99285 EMERGENCY DEPT VISIT HI MDM: CPT | Mod: 25

## 2018-10-24 PROCEDURE — 80048 BASIC METABOLIC PNL TOTAL CA: CPT

## 2018-10-24 PROCEDURE — 70551 MRI BRAIN STEM W/O DYE: CPT

## 2018-10-24 PROCEDURE — 85027 COMPLETE CBC AUTOMATED: CPT

## 2018-10-24 PROCEDURE — 85652 RBC SED RATE AUTOMATED: CPT

## 2018-10-24 PROCEDURE — 80053 COMPREHEN METABOLIC PANEL: CPT

## 2018-10-24 PROCEDURE — 96375 TX/PRO/DX INJ NEW DRUG ADDON: CPT | Mod: XU

## 2018-10-24 PROCEDURE — 83036 HEMOGLOBIN GLYCOSYLATED A1C: CPT

## 2018-10-24 PROCEDURE — 85730 THROMBOPLASTIN TIME PARTIAL: CPT

## 2018-10-24 PROCEDURE — 84100 ASSAY OF PHOSPHORUS: CPT

## 2018-10-24 PROCEDURE — 83735 ASSAY OF MAGNESIUM: CPT

## 2018-10-24 PROCEDURE — 70496 CT ANGIOGRAPHY HEAD: CPT

## 2018-10-24 PROCEDURE — 93005 ELECTROCARDIOGRAM TRACING: CPT

## 2018-10-24 PROCEDURE — 85610 PROTHROMBIN TIME: CPT

## 2018-10-24 PROCEDURE — 70544 MR ANGIOGRAPHY HEAD W/O DYE: CPT

## 2018-10-24 PROCEDURE — A9585: CPT

## 2018-10-24 PROCEDURE — 70450 CT HEAD/BRAIN W/O DYE: CPT

## 2018-10-24 PROCEDURE — 70498 CT ANGIOGRAPHY NECK: CPT

## 2018-10-24 RX ORDER — CLOPIDOGREL BISULFATE 75 MG/1
1 TABLET, FILM COATED ORAL
Qty: 30 | Refills: 3
Start: 2018-10-24 | End: 2019-02-20

## 2018-10-24 RX ORDER — CLOPIDOGREL BISULFATE 75 MG/1
1 TABLET, FILM COATED ORAL
Qty: 0 | Refills: 0 | DISCHARGE
Start: 2018-10-24

## 2018-10-24 RX ORDER — INSULIN LISPRO 100/ML
VIAL (ML) SUBCUTANEOUS AT BEDTIME
Refills: 0 | Status: DISCONTINUED | OUTPATIENT
Start: 2018-10-24 | End: 2018-10-24

## 2018-10-24 RX ORDER — ASPIRIN/CALCIUM CARB/MAGNESIUM 324 MG
1 TABLET ORAL
Qty: 0 | Refills: 0 | DISCHARGE
Start: 2018-10-24

## 2018-10-24 RX ORDER — SODIUM CHLORIDE 0.65 %
2 AEROSOL, SPRAY (ML) NASAL
Qty: 1 | Refills: 0
Start: 2018-10-24 | End: 2018-11-02

## 2018-10-24 RX ORDER — ACETAMINOPHEN 500 MG
1000 TABLET ORAL ONCE
Refills: 0 | Status: COMPLETED | OUTPATIENT
Start: 2018-10-24 | End: 2018-10-24

## 2018-10-24 RX ADMIN — Medication 1: at 07:48

## 2018-10-24 RX ADMIN — ENOXAPARIN SODIUM 40 MILLIGRAM(S): 100 INJECTION SUBCUTANEOUS at 11:10

## 2018-10-24 RX ADMIN — CLOPIDOGREL BISULFATE 75 MILLIGRAM(S): 75 TABLET, FILM COATED ORAL at 11:10

## 2018-10-24 RX ADMIN — Medication 1000 MILLIGRAM(S): at 05:49

## 2018-10-24 RX ADMIN — Medication 400 MILLIGRAM(S): at 05:34

## 2018-10-24 RX ADMIN — Medication 81 MILLIGRAM(S): at 11:10

## 2018-10-24 RX ADMIN — Medication 2: at 13:03

## 2018-10-24 NOTE — PROGRESS NOTE ADULT - ASSESSMENT
Assessment:  68 year old right handed Turkmen male admitted for carotid dissection. Patient has PMH of HTN and DM. States he was washing dishes when all of a sudden he developed a sharp pain in the right side of his neck. He was unable to turn his head. Denies any numbness, tingling, weakness, changes in vision.  On exam, Unable to move neck side to side or up and down due to pain  CTH and CTA head and neck showed focal tortuosity distal cervical segment right internal carotid artery with age-indeterminate small focal dissection flap.    Distal cervical segment R ICA dissection    Recommend:  Continue ASA and plavix  No opposition to discharge today.   Please f/u with Dr. Montes De Oca, Vascular Neurology, within 1-2 weeks.    - [facility]: [phone].

## 2018-10-24 NOTE — DISCHARGE NOTE ADULT - PLAN OF CARE
Please make an appointment with your cardiologist for proper blood pressure control. Please continue the Aspirin and Plavix daily as prescribed. Please follow up with your primary care physician to tightly control your blood sugar. It is important after surgery to control your blood sugar closely, especially after a surgery where you can be more likely to get an infection. Glucose control It is important that you follow up with your primary care physician, your cardiologist for proper blood pressure control, your vascular surgeon who admitted you, and the neurology team that assisted in your care. The contact information for your doctors are listed above. post operative recovery blood pressure control follow up care It is important that you follow up with your primary care physician, your cardiologist for proper blood pressure control, your vascular surgeon who admitted you, and the neurology team that assisted in your care. The contact information for your doctors are listed above.      Dr Giang can be reached at:  [facility]: [phone].  Please make an appointment for 1-2 weeks

## 2018-10-24 NOTE — PROGRESS NOTE ADULT - ASSESSMENT
68M Hx Htn and DM who's presenting with 1 day of right neck pain. Patient admitted with age indeterminate small focal dissection flap in right internal jugular found on CT scan.     Plan  - C/W serial neurological exam  - ASA 81, Plavix 75  - DVT ppx with Lovenox  - Regular diet  - ISS for DMII  - f/u radiology final reads MRA/MRI  - f/u neuro recommendations    Vascular Surgery   [id number]

## 2018-10-24 NOTE — DISCHARGE NOTE ADULT - HOSPITAL COURSE
68M with a past medical history of HTN, DM type II,  who presented with 1 day of right neck pain prior to arrival. The patient was doing dishes in his home when he noticed the pain. He did not have any sudden movements or trauma. The pain continued and was constant. It is worse when he turns his head to the right and slightly better when he turns his head to the left. He had not had any other symptoms such as neurological deficits (no numbness/tingling in extremities, weakness, cranial nerve deficits).     He subsequently underwent a CT Angiogram of his head and neck which revealed:  " Focal tortuosity distal cervical segment right internal carotid artery   	with age-indeterminate small focal dissection flap. Nonspecific adjacent   	inflammatory change in the right parapharyngeal fat. Consider MRA of the   	neck with and without IV contrast including a T1 fat saturated sequence   	for further characterization. The inflammatory change could be from a   	more acute process or possibly carotidynia.  	No major vessel occlusion. No hemodynamically significant stenosis using NASCET criteria."    He was evaluated by neurology who suggested a MRI/MRA of the head and neck to further evaluate the extent of the dissection. 68M with a past medical history of HTN, DM type II,  who presented with 1 day of right neck pain prior to arrival. The patient was doing dishes in his home when he noticed the pain. He did not have any sudden movements or trauma. The pain continued and was constant. It is worse when he turns his head to the right and slightly better when he turns his head to the left. He had not had any other symptoms such as neurological deficits (no numbness/tingling in extremities, weakness, cranial nerve deficits).     He subsequently underwent a CT Angiogram of his head and neck which revealed:  " Focal tortuosity distal cervical segment right internal carotid artery   	with age-indeterminate small focal dissection flap. Nonspecific adjacent   	inflammatory change in the right parapharyngeal fat. Consider MRA of the   	neck with and without IV contrast including a T1 fat saturated sequence   	for further characterization. The inflammatory change could be from a   	more acute process or possibly carotidynia.  	No major vessel occlusion. No hemodynamically significant stenosis using NASCET criteria."    He was evaluated by neurology who suggested a MRI/MRA of the head and neck to further evaluate the extent of the dissection. MRI was unchanged from CT scan. There is no further intervention needed at this time. He remains stable and is to follow up with Neurology, Vascular Surgery and Cardiology in 1-2 weeks.          Please make an appointment with Dr. Escoto in 1-2 weeks and an appointment with Dr Giang in 1 -2 weeks.     Dr. Montes De Oca may be reached for an appointment at:  [facility]: [phone].

## 2018-10-24 NOTE — DISCHARGE NOTE ADULT - PROVIDER TOKENS
TOKEN:'[id number]',FREE:[LAST:[Park],FIRST:[Crawley H],PHONE:[phone]],FAX:[(   )    -],ADDRESS:[[address] [place] [address]]] TOKEN:'[id number]',FREE:[LAST:[Park],FIRST:[Misbah NICHOLSON],PHONE:[phone]],FAX:[(   )    -],ADDRESS:[[address] [place] [address]]],TOKEN:'[id number]' TOKEN:'[id number]:'[id number]',FREE:[LAST:[Park],FIRST:[Misbah NICHOLSON],PHONE:[phone]],FAX:[(   )    -],ADDRESS:[[address] [place] [address]]]

## 2018-10-24 NOTE — DISCHARGE NOTE ADULT - CARE PROVIDER_API CALL
Ed Escoto), Surgery  Vascular  [address] [facility]  [address] [address]  Phone: [phone]  Fax: [phone]    Misbah Sharp  [address] [place] [address]  Phone: [phone]  Fax: (       - Ed Escoto (MD), Surgery  Vascular  [address] [facility]  [address]  [place] [address]  Phone: [phone]  Fax: [phone]    LilaMisbah  [address] [place] [address]  Phone: [phone]  Fax: (   )    -    Jasbir Montes De Oca (DO), Neurology; Vascular Neurology  [address] [facility] [address]  [place] [address]  Phone: [phone]  Fax: [phone] Ed Escoto), Surgery  Vascular  [address] [facility]  [address]  [place] [address]  Phone: [phone]  Fax: [phone]    Jasbir Montes De Oca (DO), Neurology; Vascular Neurology  [address] [facility] [address]  [place] [address]  Phone: [phone]  Fax: [phone]    Misbah Sharp  [address] [place] [address]  Phone: [phone]  Fax: (       -

## 2018-10-24 NOTE — DISCHARGE NOTE ADULT - MEDICATION SUMMARY - MEDICATIONS TO TAKE
I will START or STAY ON the medications listed below when I get home from the hospital:    aspirin 81 mg oral tablet, chewable  -- 1 tab(s) by mouth once a day  -- Indication: For DM (diabetes mellitus)    Tylenol Regular Strength 325 mg oral tablet  -- 2 tab(s) by mouth every 4 hours, As Needed for pain  -- Indication: For pain    metFORMIN  -- Indication: For Diabetes    glimepiride  -- Indication: For Diabetes    Basaglar KwikPen  -- Indication: For Diabetes    hydrochlorothiazide-losartan  -- Indication: For High blood pressure    clopidogrel 75 mg oral tablet  -- 1 tab(s) by mouth once a day  -- Indication: For anti-platelet to help stop clotting    Nasal Saline 0.65% nasal spray  -- 2 spray(s) into nose 4 times a day, As Needed  -- Indication: For moisture for nose I will START or STAY ON the medications listed below when I get home from the hospital:    aspirin 81 mg oral tablet, chewable  -- 1 tab(s) by mouth once a day  -- Indication: For DM (diabetes mellitus)    Tylenol Regular Strength 325 mg oral tablet  -- 2 tab(s) by mouth every 4 hours, As Needed for pain  -- Indication: For pain    metFORMIN  -- Indication: For Diabetes    glimepiride  -- Indication: For Diabetes    Basaglar KwikPen  -- Indication: For Diabetes    hydrochlorothiazide-losartan  -- Indication: For High blood pressure    clopidogrel 75 mg oral tablet  -- 1 tab(s) by mouth once a day MDD:1  -- Do not take aspirin or aspirin containing products without knowledge and consent of your physician.    -- Indication: For antiplatelet    clopidogrel 75 mg oral tablet  -- 1 tab(s) by mouth once a day  -- Indication: For anti-platelet to help stop clotting    Nasal Saline 0.65% nasal spray  -- 2 spray(s) into nose 4 times a day, As Needed  -- Indication: For moisture for nose

## 2018-10-24 NOTE — DISCHARGE NOTE ADULT - INSTRUCTIONS
There are no restrictions to what you eat or drink. You may eat as you normally do and consume liquids as normal

## 2018-10-24 NOTE — DISCHARGE NOTE ADULT - ADDITIONAL INSTRUCTIONS
Please follow up with the vascular surgeon in 7 to 10 days. Make all appointments for 1-2 weeks from now for all follow ups as mentioned before: your primary care, vascular surgery, neurology, and your cardiologist, Dr. Sharp. Please follow up with the vascular surgeon in 7 to 10 days. Make all appointments for 1-2 weeks from now for all follow ups as mentioned before: your primary care, vascular surgery, and your cardiologist, Dr. Sharp.    Please follow up with Neurology in 3 months. Dr Montes De Oca is the Neurologist that saw you while you were inpatient. Please follow up with the vascular surgeon in 7 to 10 days. Make all appointments for 1-2 weeks from now for all follow ups as mentioned before: your primary care, vascular surgery, neurology, and your cardiologist, Dr. Sharp.    Please follow up with Neurology in 3 months. Dr Montes De Oca is the Neurologist that saw you while you were inpatient.

## 2018-10-24 NOTE — DISCHARGE NOTE ADULT - CARE PLAN
Principal Discharge DX:	Internal carotid artery dissection  Goal:	post operative recovery  Assessment and plan of treatment:	It is important that you follow up with your primary care physician, your cardiologist for proper blood pressure control, your vascular surgeon who admitted you, and the neurology team that assisted in your care. The contact information for your doctors are listed above.  Secondary Diagnosis:	Essential hypertension  Goal:	blood pressure control  Assessment and plan of treatment:	Please make an appointment with your cardiologist for proper blood pressure control. Please continue the Aspirin and Plavix daily as prescribed.  Secondary Diagnosis:	Type 2 diabetes mellitus with complication, with long-term current use of insulin  Goal:	Glucose control  Assessment and plan of treatment:	Please follow up with your primary care physician to tightly control your blood sugar. It is important after surgery to control your blood sugar closely, especially after a surgery where you can be more likely to get an infection. Principal Discharge DX:	Internal carotid artery dissection  Goal:	follow up care  Assessment and plan of treatment:	It is important that you follow up with your primary care physician, your cardiologist for proper blood pressure control, your vascular surgeon who admitted you, and the neurology team that assisted in your care. The contact information for your doctors are listed above.      Dr Giang can be reached at:  [facility]: [phone].  Please make an appointment for 1-2 weeks  Secondary Diagnosis:	Essential hypertension  Goal:	blood pressure control  Assessment and plan of treatment:	Please make an appointment with your cardiologist for proper blood pressure control. Please continue the Aspirin and Plavix daily as prescribed.  Secondary Diagnosis:	Type 2 diabetes mellitus with complication, with long-term current use of insulin  Goal:	Glucose control  Assessment and plan of treatment:	Please follow up with your primary care physician to tightly control your blood sugar. It is important after surgery to control your blood sugar closely, especially after a surgery where you can be more likely to get an infection.

## 2018-10-24 NOTE — DISCHARGE NOTE ADULT - PATIENT PORTAL LINK FT
You can access the [facility], offered by [facility], by registering with the following website: [email or web address]

## 2018-10-24 NOTE — PROGRESS NOTE ADULT - SUBJECTIVE AND OBJECTIVE BOX
Neurology  Progress Note  10-24-18    Name:  MARIANA PEARL; 68y    Interval History: No overnight events.     HPI:  68M Hx Htn, DM who presented with 1 day of right neck pain. The patient was doing dishes in his home when he noticed the pain. He did not have any sudden movements or trauma. The pain continued and was constant. It is worse when he turns his head to the right and slightly better when he turns his head to the left. He has not had any other symptoms such as neurological deficits (no numbness/tingling in extremities, weakness, CN deficits). (23 Oct 2018 02:22)    REVIEW OF SYSTEMS:  As states in HPI.    Medications:  acetaminophen  IVPB .. 1000 milliGRAM(s) IV Intermittent once  acetaminophen  IVPB .. 1000 milliGRAM(s) IV Intermittent once  acetaminophen  IVPB .. 1000 milliGRAM(s) IV Intermittent once  acetaminophen  IVPB .. 1000 milliGRAM(s) IV Intermittent once  aspirin  chewable 81 milliGRAM(s) Oral daily  clopidogrel Tablet 75 milliGRAM(s) Oral daily  dextrose 40% Gel 15 Gram(s) Oral once PRN  dextrose 5%. 1000 milliLiter(s) IV Continuous <Continuous>  dextrose 50% Injectable 12.5 Gram(s) IV Push once  dextrose 50% Injectable 25 Gram(s) IV Push once  dextrose 50% Injectable 25 Gram(s) IV Push once  diphenhydrAMINE   Injectable 25 milliGRAM(s) IV Push Once  enoxaparin Injectable 40 milliGRAM(s) SubCutaneous daily  glucagon  Injectable 1 milliGRAM(s) IntraMuscular once PRN  influenza   Vaccine 0.5 milliLiter(s) IntraMuscular once  insulin lispro (HumaLOG) corrective regimen sliding scale   SubCutaneous at bedtime  insulin lispro (HumaLOG) corrective regimen sliding scale   SubCutaneous three times a day before meals  metoclopramide Injectable 10 milliGRAM(s) IV Push once    Vitals:  T(C): 36.8 (10-24-18 @ 13:03), Max: 37 (10-24-18 @ 05:20)  HR: 100 (10-24-18 @ 13:03) (72 - 100)  BP: 136/82 (10-24-18 @ 13:03) (125/80 - 154/97)  RR: 18 (10-24-18 @ 13:03) (16 - 18)  SpO2: 96% (10-24-18 @ 13:03) (95% - 98%)    Labs:                        15.8   9.6   )-----------( 204      ( 24 Oct 2018 07:00 )             46.8     10-24    138  |  101  |  20  ----------------------------<  147<H>  4.0   |  22  |  0.87    Ca    9.5      24 Oct 2018 07:00  Phos  2.9     10-24  Mg     2.1     10-24    TPro  7.9  /  Alb  4.9  /  TBili  0.6  /  DBili  x   /  AST  16  /  ALT  16  /  AlkPhos  73  10-22    CAPILLARY BLOOD GLUCOSE      POCT Blood Glucose.: 241 mg/dL (24 Oct 2018 12:26)    LIVER FUNCTIONS - ( 22 Oct 2018 20:57 )  Alb: 4.9 g/dL / Pro: 7.9 g/dL / ALK PHOS: 73 U/L / ALT: 16 U/L / AST: 16 U/L / GGT: x             PT/INR - ( 22 Oct 2018 20:57 )   PT: 10.6 sec;   INR: 0.97 ratio         PTT - ( 22 Oct 2018 20:57 )  PTT:32.7 sec  CSF:    Neurological Examination:    Mental Status: Patient is alert, awake, oriented X3. Patient is fluent, no dysarthria, no aphasia. Follows commands well and able to answer questions appropriately. Mood and affect normal.    Cranial Nerves: PERRL, EOMI, visual field intact, V1-V3 intact, no gross facial asymmetry, tongue/uvula midline  Unable to move neck side to side or up and down without pain.    Motor Exam: No drift  Right upper extremity: 5/5  Left upper extremity: 5/5  Right lower extremity: 5/5  Left lower extremity: 5/5    Normal bulk/tone    Sensory: Intact to light touch bilaterally. No extinction    Coordination: Finger to nose intact bilaterally     GENERAL: No acute distress  HEENT:  Normocephalic, atraumatic  EXTREMITIES: No edema, clubbing, cyanosis  MUSCULOSKELETAL: Normal range of motion  SKIN: No rashes        Radiology:  < from: MR Head No Cont (10.23.18 @ 20:42) >  Impression:    Brain MRI: No acute hemorrhage or infarct.    Neck MRA: Focal dissection flap along the distal right internal carotid   artery located within a tortuous segment and proximal to entry into the   carotid canal.    Brain MRA: No evidence for vascular occlusion.    MARILYN MOREAU M.D., ATTENDING RADIOLOGIST  This document has been electronically signed. Oct 24 2018 10:54AM  < end of copied text >
GENERAL SURGERY DAILY PROGRESS NOTE:       Subjective:  Patient seen and examined on AM rounds. Up in chair. Patient got MRA/MRI yesterday. Awaiting final reads. Seen by neurology yesterday. Continues to have pain with neck movement. Controlled with IV tylenol. Neuro exam unchanged.         Objective:    PE:  Gen: NAD  Resp: airway patent, respirations unlabored, no increased WoB  HEENT: pain with movement. no hematoma or collection palpated.   Neuro: AAOx3, no focal deficits    Vital Signs Last 24 Hrs  T(C): 37 (24 Oct 2018 05:20), Max: 37.6 (23 Oct 2018 13:30)  T(F): 98.6 (24 Oct 2018 05:20), Max: 99.7 (23 Oct 2018 13:30)  HR: 72 (24 Oct 2018 05:20) (57 - 77)  BP: 154/97 (24 Oct 2018 05:20) (128/80 - 154/97)  BP(mean): --  RR: 18 (24 Oct 2018 05:20) (17 - 18)  SpO2: 98% (24 Oct 2018 05:20) (95% - 98%)    I&O's Detail    23 Oct 2018 07:01  -  24 Oct 2018 07:00  --------------------------------------------------------  IN:    IV PiggyBack: 300 mL    Oral Fluid: 960 mL  Total IN: 1260 mL    OUT:    Voided: 375 mL  Total OUT: 375 mL    Total NET: 885 mL          Daily     Daily     MEDICATIONS  (STANDING):  aspirin  chewable 81 milliGRAM(s) Oral daily  clopidogrel Tablet 75 milliGRAM(s) Oral daily  dextrose 5%. 1000 milliLiter(s) (50 mL/Hr) IV Continuous <Continuous>  dextrose 50% Injectable 12.5 Gram(s) IV Push once  dextrose 50% Injectable 25 Gram(s) IV Push once  dextrose 50% Injectable 25 Gram(s) IV Push once  enoxaparin Injectable 40 milliGRAM(s) SubCutaneous daily  influenza   Vaccine 0.5 milliLiter(s) IntraMuscular once  insulin lispro (HumaLOG) corrective regimen sliding scale   SubCutaneous at bedtime  insulin lispro (HumaLOG) corrective regimen sliding scale   SubCutaneous three times a day before meals    MEDICATIONS  (PRN):  dextrose 40% Gel 15 Gram(s) Oral once PRN Blood Glucose LESS THAN 70 milliGRAM(s)/deciliter  glucagon  Injectable 1 milliGRAM(s) IntraMuscular once PRN Glucose LESS THAN 70 milligrams/deciliter      LABS:                        16.0   8.1   )-----------( 207      ( 22 Oct 2018 20:57 )             47.0     10    139  |  101  |  18  ----------------------------<  129<H>  3.8   |  25  |  1.01    Ca    9.8      22 Oct 2018 20:57    TPro  7.9  /  Alb  4.9  /  TBili  0.6  /  DBili  x   /  AST  16  /  ALT  16  /  AlkPhos  73  10-    PT/INR - ( 22 Oct 2018 20:57 )   PT: 10.6 sec;   INR: 0.97 ratio         PTT - ( 22 Oct 2018 20:57 )  PTT:32.7 sec  Urinalysis Basic - ( 23 Oct 2018 01:52 )    Color: Colorless / Appearance: Clear / S.034 / pH: x  Gluc: x / Ketone: Negative  / Bili: Negative / Urobili: Negative   Blood: x / Protein: Trace / Nitrite: Negative   Leuk Esterase: Negative / RBC: 1 /hpf / WBC 0 /hpf   Sq Epi: x / Non Sq Epi: 0 /hpf / Bacteria: Negative
SURGERY DAILY PROGRESS NOTE: Vascular Surgery       SUBJECTIVE/ROS: Patient seen and evaluated at the bedside. Lao speaking and requested for daughter at the bedside to translate. States that he has right neck pain and weakness in his right arm. Denies numbness and tingling and lower extremity weakness. Denies neck trauma. Has a history of a cardiac catheterization for discomfort in his chest while at rest approximately 2 months ago per daughter with Dr. Misbah Sharp. Per daughter no intervention or medications started. He has a history of hypertension and diabetes. Quit smoking 15 years ago.  Denies nausea, vomiting, chest pain, shortness of breath.       OBJECTIVE:    Vital Signs Last 24 Hrs  T(C): 36.9 (23 Oct 2018 04:17), Max: 37.1 (22 Oct 2018 20:12)  T(F): 98.4 (23 Oct 2018 04:17), Max: 98.7 (22 Oct 2018 20:12)  HR: 60 (23 Oct 2018 04:17) (60 - 83)  BP: 152/75 (23 Oct 2018 04:17) (130/89 - 169/80)  BP(mean): --  RR: 18 (23 Oct 2018 04:17) (15 - 18)  SpO2: 96% (23 Oct 2018 04:17) (95% - 98%)  I&O's Detail    22 Oct 2018 07:01  -  23 Oct 2018 07:00  --------------------------------------------------------  IN:    Oral Fluid: 120 mL  Total IN: 120 mL    OUT:  Total OUT: 0 mL    Total NET: 120 mL        Daily Height in cm: 176 (23 Oct 2018 02:31)    Daily   MEDICATIONS  (STANDING):  aspirin  chewable 81 milliGRAM(s) Oral daily  dextrose 5%. 1000 milliLiter(s) (50 mL/Hr) IV Continuous <Continuous>  dextrose 50% Injectable 12.5 Gram(s) IV Push once  dextrose 50% Injectable 25 Gram(s) IV Push once  dextrose 50% Injectable 25 Gram(s) IV Push once  enoxaparin Injectable 40 milliGRAM(s) SubCutaneous daily  influenza   Vaccine 0.5 milliLiter(s) IntraMuscular once  insulin lispro (HumaLOG) corrective regimen sliding scale   SubCutaneous three times a day before meals    MEDICATIONS  (PRN):  dextrose 40% Gel 15 Gram(s) Oral once PRN Blood Glucose LESS THAN 70 milliGRAM(s)/deciliter  glucagon  Injectable 1 milliGRAM(s) IntraMuscular once PRN Glucose LESS THAN 70 milligrams/deciliter      LABS:                        16.0   8.1   )-----------( 207      ( 22 Oct 2018 20:57 )             47.0     10    139  |  101  |  18  ----------------------------<  129<H>  3.8   |  25  |  1.01    Ca    9.8      22 Oct 2018 20:57    TPro  7.9  /  Alb  4.9  /  TBili  0.6  /  DBili  x   /  AST  16  /  ALT  16  /  AlkPhos  73  10-    PT/INR - ( 22 Oct 2018 20:57 )   PT: 10.6 sec;   INR: 0.97 ratio         PTT - ( 22 Oct 2018 20:57 )  PTT:32.7 sec  Urinalysis Basic - ( 23 Oct 2018 01:52 )    Color: Colorless / Appearance: Clear / S.034 / pH: x  Gluc: x / Ketone: Negative  / Bili: Negative / Urobili: Negative   Blood: x / Protein: Trace / Nitrite: Negative   Leuk Esterase: Negative / RBC: 1 /hpf / WBC 0 /hpf   Sq Epi: x / Non Sq Epi: 0 /hpf / Bacteria: Negative      PHYSICAL EXAM:  Constitutional: well developed, well nourished, NAD  Neck: supple, no JVD  Respiratory: non labored breathing  Gastrointestinal: abdomen soft, nontender, nondistended.   Extremities: FROM, warm, palpable peripheral pulses  Neurological: intact, non-focal, CN grossly intact, 5/5 strength in upper and lower extremity, no sensation loss   Skin: no gross lesions

## 2018-11-02 PROBLEM — Z00.00 ENCOUNTER FOR PREVENTIVE HEALTH EXAMINATION: Status: ACTIVE | Noted: 2018-11-02

## 2018-11-23 ENCOUNTER — APPOINTMENT (OUTPATIENT)
Dept: VASCULAR SURGERY | Facility: CLINIC | Age: 68
End: 2018-11-23
Payer: MEDICAID

## 2018-11-23 VITALS
SYSTOLIC BLOOD PRESSURE: 131 MMHG | DIASTOLIC BLOOD PRESSURE: 84 MMHG | WEIGHT: 180.78 LBS | TEMPERATURE: 98 F | HEART RATE: 84 BPM | BODY MASS INDEX: 27.4 KG/M2 | HEIGHT: 68 IN

## 2018-11-23 DIAGNOSIS — I77.71 DISSECTION OF CAROTID ARTERY: ICD-10-CM

## 2018-11-23 PROCEDURE — 99213 OFFICE O/P EST LOW 20 MIN: CPT

## 2019-03-13 ENCOUNTER — OUTPATIENT (OUTPATIENT)
Dept: OUTPATIENT SERVICES | Facility: HOSPITAL | Age: 69
LOS: 1 days | End: 2019-03-13
Payer: MEDICAID

## 2019-03-13 ENCOUNTER — APPOINTMENT (OUTPATIENT)
Dept: MRI IMAGING | Facility: CLINIC | Age: 69
End: 2019-03-13
Payer: MEDICAID

## 2019-03-13 DIAGNOSIS — Z00.8 ENCOUNTER FOR OTHER GENERAL EXAMINATION: ICD-10-CM

## 2019-03-13 PROCEDURE — 70549 MR ANGIOGRAPH NECK W/O&W/DYE: CPT | Mod: 26

## 2019-03-13 PROCEDURE — A9585: CPT

## 2019-03-13 PROCEDURE — 70549 MR ANGIOGRAPH NECK W/O&W/DYE: CPT

## 2019-07-14 ENCOUNTER — EMERGENCY (EMERGENCY)
Facility: HOSPITAL | Age: 69
LOS: 1 days | Discharge: HOME | End: 2019-07-14
Attending: EMERGENCY MEDICINE
Payer: MEDICAID

## 2019-07-14 VITALS
DIASTOLIC BLOOD PRESSURE: 79 MMHG | OXYGEN SATURATION: 96 % | RESPIRATION RATE: 18 BRPM | HEART RATE: 96 BPM | SYSTOLIC BLOOD PRESSURE: 115 MMHG | TEMPERATURE: 98 F

## 2019-07-14 VITALS
TEMPERATURE: 98 F | OXYGEN SATURATION: 98 % | HEIGHT: 69 IN | DIASTOLIC BLOOD PRESSURE: 93 MMHG | RESPIRATION RATE: 18 BRPM | HEART RATE: 84 BPM | WEIGHT: 177.91 LBS | SYSTOLIC BLOOD PRESSURE: 143 MMHG

## 2019-07-14 LAB
ALBUMIN SERPL ELPH-MCNC: 4.4 G/DL — SIGNIFICANT CHANGE UP (ref 3.3–5)
ALP SERPL-CCNC: 72 U/L — SIGNIFICANT CHANGE UP (ref 40–120)
ALT FLD-CCNC: 11 U/L — SIGNIFICANT CHANGE UP (ref 10–45)
ANION GAP SERPL CALC-SCNC: 12 MMOL/L — SIGNIFICANT CHANGE UP (ref 5–17)
APTT BLD: 34.5 SEC — SIGNIFICANT CHANGE UP (ref 27.5–36.3)
AST SERPL-CCNC: 12 U/L — SIGNIFICANT CHANGE UP (ref 10–40)
BASOPHILS # BLD AUTO: 0 K/UL — SIGNIFICANT CHANGE UP (ref 0–0.2)
BASOPHILS NFR BLD AUTO: 0 % — SIGNIFICANT CHANGE UP (ref 0–2)
BILIRUB SERPL-MCNC: 0.8 MG/DL — SIGNIFICANT CHANGE UP (ref 0.2–1.2)
BUN SERPL-MCNC: 23 MG/DL — SIGNIFICANT CHANGE UP (ref 7–23)
CALCIUM SERPL-MCNC: 9.5 MG/DL — SIGNIFICANT CHANGE UP (ref 8.4–10.5)
CHLORIDE SERPL-SCNC: 99 MMOL/L — SIGNIFICANT CHANGE UP (ref 96–108)
CO2 SERPL-SCNC: 26 MMOL/L — SIGNIFICANT CHANGE UP (ref 22–31)
CREAT SERPL-MCNC: 0.98 MG/DL — SIGNIFICANT CHANGE UP (ref 0.5–1.3)
EOSINOPHIL # BLD AUTO: 0.2 K/UL — SIGNIFICANT CHANGE UP (ref 0–0.5)
EOSINOPHIL NFR BLD AUTO: 1.6 % — SIGNIFICANT CHANGE UP (ref 0–6)
GLUCOSE SERPL-MCNC: 91 MG/DL — SIGNIFICANT CHANGE UP (ref 70–99)
HCT VFR BLD CALC: 40.3 % — SIGNIFICANT CHANGE UP (ref 39–50)
HGB BLD-MCNC: 14.2 G/DL — SIGNIFICANT CHANGE UP (ref 13–17)
INR BLD: 1.1 RATIO — SIGNIFICANT CHANGE UP (ref 0.88–1.16)
LYMPHOCYTES # BLD AUTO: 1.9 K/UL — SIGNIFICANT CHANGE UP (ref 1–3.3)
LYMPHOCYTES # BLD AUTO: 18 % — SIGNIFICANT CHANGE UP (ref 13–44)
MCHC RBC-ENTMCNC: 30.7 PG — SIGNIFICANT CHANGE UP (ref 27–34)
MCHC RBC-ENTMCNC: 35.2 GM/DL — SIGNIFICANT CHANGE UP (ref 32–36)
MCV RBC AUTO: 87.1 FL — SIGNIFICANT CHANGE UP (ref 80–100)
MONOCYTES # BLD AUTO: 0.9 K/UL — SIGNIFICANT CHANGE UP (ref 0–0.9)
MONOCYTES NFR BLD AUTO: 8.7 % — SIGNIFICANT CHANGE UP (ref 2–14)
NEUTROPHILS # BLD AUTO: 7.5 K/UL — HIGH (ref 1.8–7.4)
NEUTROPHILS NFR BLD AUTO: 71.6 % — SIGNIFICANT CHANGE UP (ref 43–77)
PLATELET # BLD AUTO: 335 K/UL — SIGNIFICANT CHANGE UP (ref 150–400)
POTASSIUM SERPL-MCNC: 4.3 MMOL/L — SIGNIFICANT CHANGE UP (ref 3.5–5.3)
POTASSIUM SERPL-SCNC: 4.3 MMOL/L — SIGNIFICANT CHANGE UP (ref 3.5–5.3)
PROT SERPL-MCNC: 8 G/DL — SIGNIFICANT CHANGE UP (ref 6–8.3)
PROTHROM AB SERPL-ACNC: 12.7 SEC — SIGNIFICANT CHANGE UP (ref 10–12.9)
RBC # BLD: 4.63 M/UL — SIGNIFICANT CHANGE UP (ref 4.2–5.8)
RBC # FLD: 12 % — SIGNIFICANT CHANGE UP (ref 10.3–14.5)
SODIUM SERPL-SCNC: 137 MMOL/L — SIGNIFICANT CHANGE UP (ref 135–145)
WBC # BLD: 10.5 K/UL — SIGNIFICANT CHANGE UP (ref 3.8–10.5)
WBC # FLD AUTO: 10.5 K/UL — SIGNIFICANT CHANGE UP (ref 3.8–10.5)

## 2019-07-14 PROCEDURE — 85610 PROTHROMBIN TIME: CPT

## 2019-07-14 PROCEDURE — 70496 CT ANGIOGRAPHY HEAD: CPT | Mod: 26

## 2019-07-14 PROCEDURE — 70496 CT ANGIOGRAPHY HEAD: CPT

## 2019-07-14 PROCEDURE — 70498 CT ANGIOGRAPHY NECK: CPT | Mod: 26

## 2019-07-14 PROCEDURE — 99284 EMERGENCY DEPT VISIT MOD MDM: CPT | Mod: 25

## 2019-07-14 PROCEDURE — 70498 CT ANGIOGRAPHY NECK: CPT

## 2019-07-14 PROCEDURE — 80053 COMPREHEN METABOLIC PANEL: CPT

## 2019-07-14 PROCEDURE — 85730 THROMBOPLASTIN TIME PARTIAL: CPT

## 2019-07-14 PROCEDURE — 99284 EMERGENCY DEPT VISIT MOD MDM: CPT

## 2019-07-14 PROCEDURE — 96374 THER/PROPH/DIAG INJ IV PUSH: CPT | Mod: XU

## 2019-07-14 PROCEDURE — 85027 COMPLETE CBC AUTOMATED: CPT

## 2019-07-14 RX ORDER — ACETAMINOPHEN 500 MG
975 TABLET ORAL ONCE
Refills: 0 | Status: COMPLETED | OUTPATIENT
Start: 2019-07-14 | End: 2019-07-14

## 2019-07-14 RX ORDER — KETOROLAC TROMETHAMINE 30 MG/ML
15 SYRINGE (ML) INJECTION ONCE
Refills: 0 | Status: DISCONTINUED | OUTPATIENT
Start: 2019-07-14 | End: 2019-07-14

## 2019-07-14 RX ADMIN — Medication 975 MILLIGRAM(S): at 10:52

## 2019-07-14 RX ADMIN — Medication 15 MILLIGRAM(S): at 13:36

## 2019-07-14 NOTE — ED PROVIDER NOTE - NS ED ROS FT
General: no fever  Head: no headache  Eyes: no vision change  ENT: +right neck pain  CV: no chest pain  Resp: no SOB  GI: no N/V, no abdominal pain  MSK: +right neck pain  Skin: no rash  Neuro: no focal weakness, no change in sensation

## 2019-07-14 NOTE — ED PROVIDER NOTE - CLINICAL SUMMARY MEDICAL DECISION MAKING FREE TEXT BOX
Rachel: pt presents with neck pain which could be muscular, no trauma. Patient had same pain before and was diagnosed with carotid dz. On blood thinners. Will treat pain and ensure no change in previous carotid dz.

## 2019-07-14 NOTE — ED PROVIDER NOTE - ATTENDING CONTRIBUTION TO CARE
I performed a history and physical exam of the patient and discussed their management with the resident and /or advanced care provider. I reviewed the resident and /or ACP's note and agree with the documented findings and plan of care. My medical decison making and observations are found above.  Lungs clear, abd soft, nl neuro exam, nl gait.

## 2019-07-14 NOTE — ED PROVIDER NOTE - NSFOLLOWUPINSTRUCTIONS_ED_ALL_ED_FT
Please follow up with your primary care provider in the next few days.    Take 500mg tylenol every 6 hours as needed for pain.  Take 400mg ibuprofen every 6 hours as needed for pain, make sure to take with food.  Use a lidocaine patch (salonpas) on the area for up to 12 hours at a time as needed.     You can apply heat compress for 20 to 30 minutes every 2 hours. Heat can help decrease pain and muscle spasms.     Continue all other home medications as prescribed.    Return to the ED for any worsening symptoms of headache, change in vision, slurred speech, unsteadiness, confusion, or any new or concerning symptoms.    Please read all attached.

## 2019-07-14 NOTE — ED PROVIDER NOTE - PROGRESS NOTE DETAILS
Batsheva Urias, resident MD: pt reports some improvement of pain with tylenol. CTA reveals unchanged vasculature from previous CTA. will discharge patient home at this time. printed out copies of results for patient to take home. discussed return precautions and need for outpatient follow up.

## 2019-07-14 NOTE — ED ADULT NURSE REASSESSMENT NOTE - NS ED NURSE REASSESS COMMENT FT1
Pt reports relief after pain medication administration as per MD order. Pt reports 3/10 at rest and 5/10 with activity. Increased ROM of neck moving left to right. Pt off to CT scan with daughter at bedside.

## 2019-07-14 NOTE — ED PROVIDER NOTE - PHYSICAL EXAMINATION
General: well-appearing elderly man in no acute distress  Head: normocephalic, atraumatic  Eyes: PERRL, EOMI  Mouth: moist mucous membranes  Neck: supple neck, tender to palpation of right posterior and lateral neck, no bruits, no lymphadenopathy, no Cspine tenderness to palpation, pain elicited with ROM  CV: normal rate and rhythm  Respiratory: clear to auscultation bilaterally  Abdomen: soft, nontender, nondistended  Back: no midline tenderness to palpation, no CVAT  Neuro: alert and oriented x3, CN III-XII intact, speech clear, strength 5/5 UE and LE bilaterally, sensation equal and intact bilaterally, ambulating without difficulty or unsteadiness, negative Romberg's.  Skin: no rashes or lesions on neck  Extremities: no LE edema or tenderness to palpation, full ROM of all extremity joints, peripheral pulses 2+ bilaterally

## 2019-07-14 NOTE — ED PROVIDER NOTE - OBJECTIVE STATEMENT
70yo man PMH HTN, HLD, DM presents with right-sided neck pain x 3-4 days. Pt reports that he started to have pain in the right neck with no injury or muscle strain to the area. He then had acute worsening of the right neck pain at 1am. He also reports that he has had 3 episodes of unsteadiness while walking in the past 1-2 weeks and had required to steady himself on the wall. He denies headache, focal numbness or weakness in arms or legs. No change in vision, no slurred speech, no dysphagia. No chest pain or SOB, no n/v, no abdominal pain.  He was diagnosed with a focal tortuosity of cervical segment right carotid artery with a small dissection flap last year on CTA neck and MRA and he is being followed by neurology and cardiology and is on plavix and asa 81mg daily and has been taking them as prescribed. He reports that the pain this time is exactly the same as it was when he came last year.

## 2020-08-12 NOTE — ED ADULT NURSE NOTE - NSSISCREENINGQ4_ED_A_ED
Anticipated Discharge Disposition: [facility]    Action: Per LOUIS HODGES Elisa, the transfer to [facility] is scheduled for today at 6:00pm via [facility]. RN CM completed the transfer packet and the [facility] form. VIVIANA provided the packet to LOUIS Leo.    Barriers to Discharge: None    Plan: [facility].   No

## 2020-10-02 PROBLEM — I77.71 CAROTID ARTERY DISSECTION: Status: ACTIVE | Noted: 2018-11-23

## 2020-10-19 NOTE — CONSULT NOTE ADULT - ASSESSMENT
Requested Prescriptions   Pending Prescriptions Disp Refills    oxyCODONE-acetaminophen (PERCOCET)  MG per tablet 90 tablet 0     Sig: Take 1 tablet by mouth every 6 hours as needed for moderate to severe pain       There is no refill protocol information for this order        Routing refill request to provider for review/approval because:  Drug not on the [facility] refill protocol   Marisela Shea RN,BSN  [facility]           68 year old right handed [place] male admitted for carotid dissection. Patient has PMH of HTN and DM. States he was washing dishes when all of a sudden he developed a sharp pain in the right side of his neck. He was unable to turn his head. Denies any numbness, tingling, weakness, changes in vision.  On exam, Unable to move neck side to side or up and down due to pain  CTH and CTA head and neck showed focal tortuosity distal cervical segment right internal carotid artery with age-indeterminate small focal dissection flap.    Distal cervical segment R ICA dissection    Recommend:  Continue ASA and plavix  MRI brain without contrast  MRA head without contrast  MRA neck with and without IV contrast including a T1 fat saturated sequence

## 2024-04-04 RX ORDER — METFORMIN HYDROCHLORIDE 850 MG/1
0 TABLET ORAL
Qty: 0 | Refills: 0 | DISCHARGE

## 2024-04-04 RX ORDER — ACETAMINOPHEN 500 MG
2 TABLET ORAL
Qty: 0 | Refills: 0 | DISCHARGE

## 2024-04-04 RX ORDER — INSULIN GLARGINE 100 [IU]/ML
0 INJECTION, SOLUTION SUBCUTANEOUS
Qty: 0 | Refills: 0 | DISCHARGE

## 2024-04-04 RX ORDER — LOSARTAN/HYDROCHLOROTHIAZIDE 100MG-25MG
0 TABLET ORAL
Qty: 0 | Refills: 0 | DISCHARGE

## 2024-04-04 RX ORDER — GLIMEPIRIDE 1 MG
0 TABLET ORAL
Qty: 0 | Refills: 0 | DISCHARGE

## 2024-04-04 RX ORDER — SODIUM CHLORIDE 0.65 %
2 AEROSOL, SPRAY (ML) NASAL
Qty: 0 | Refills: 0 | DISCHARGE

## 2025-05-30 NOTE — ED ADULT NURSE REASSESSMENT NOTE - NS ED NURSE REASSESS COMMENT FT1
Patient AOx3, resting in bed, complaining of pain with movement. Awaiting vascular surgery consult. Will continue to monitor. [FreeTextEntry1] : Labs show - TSH elevated, increase Synthroid to 50 mcg daily - Potassium is normal but patient having some nausea doing Rx every other day therefore changed to 2-3 times a week